# Patient Record
Sex: MALE
[De-identification: names, ages, dates, MRNs, and addresses within clinical notes are randomized per-mention and may not be internally consistent; named-entity substitution may affect disease eponyms.]

---

## 2017-02-16 ENCOUNTER — LAB SERVICES (OUTPATIENT)
Dept: OTHER | Age: 27
End: 2017-02-16

## 2017-02-16 ENCOUNTER — CHARTING TRANS (OUTPATIENT)
Dept: OTHER | Age: 27
End: 2017-02-16

## 2017-02-16 LAB — RAPID STREP GROUP A: POSITIVE

## 2017-06-20 ENCOUNTER — APPOINTMENT (OUTPATIENT)
Dept: OTHER | Facility: HOSPITAL | Age: 27
End: 2017-06-20
Attending: EMERGENCY MEDICINE

## 2018-01-02 ENCOUNTER — HOSPITAL ENCOUNTER (EMERGENCY)
Facility: HOSPITAL | Age: 28
Discharge: HOME | End: 2018-01-02
Attending: EMERGENCY MEDICINE
Payer: OTHER MISCELLANEOUS

## 2018-01-02 ENCOUNTER — APPOINTMENT (OUTPATIENT)
Dept: GENERAL RADIOLOGY | Facility: HOSPITAL | Age: 28
End: 2018-01-02
Attending: EMERGENCY MEDICINE
Payer: OTHER MISCELLANEOUS

## 2018-01-02 VITALS
HEIGHT: 70 IN | OXYGEN SATURATION: 100 % | WEIGHT: 215 LBS | SYSTOLIC BLOOD PRESSURE: 149 MMHG | DIASTOLIC BLOOD PRESSURE: 80 MMHG | BODY MASS INDEX: 30.78 KG/M2 | TEMPERATURE: 99 F | HEART RATE: 70 BPM | RESPIRATION RATE: 18 BRPM

## 2018-01-02 DIAGNOSIS — S86.912A KNEE STRAIN, LEFT, INITIAL ENCOUNTER: Primary | ICD-10-CM

## 2018-01-02 PROCEDURE — 73560 X-RAY EXAM OF KNEE 1 OR 2: CPT | Performed by: EMERGENCY MEDICINE

## 2018-01-02 PROCEDURE — 99283 EMERGENCY DEPT VISIT LOW MDM: CPT

## 2018-01-02 NOTE — ED PROVIDER NOTES
Patient Seen in: [facility] AND [facility] Emergency Department    History   Patient presents with:  Lower Extremity Injury (musculoskeletal)    Stated Complaint:     HPI    Patient presents after injury while at work.   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 No ligament instability noted no open wound distal pulse 2+ and intact motor and sensation intact  Skin:  Warm, dry, well perfused. Good skin turgor.   No rashes seen

## 2018-01-02 NOTE — ED NOTES
Care assumed from triage. Patient presents with L knee pain s/p fall at work. 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 Denies head injury or loss of consciousness.  CMS intact, lower

## 2018-01-03 NOTE — CM/SW NOTE
Received call from pt requesting assistance with obtaining [facility] appt, instructed to have claim's  name/ph# w/claim number. This writer lvmm to [address] [place] [address] of [facility] for next available appointment.

## 2018-01-05 ENCOUNTER — APPOINTMENT (OUTPATIENT)
Dept: OTHER | Facility: HOSPITAL | Age: 28
End: 2018-01-05
Attending: EMERGENCY MEDICINE

## 2018-01-12 ENCOUNTER — APPOINTMENT (OUTPATIENT)
Dept: OTHER | Facility: HOSPITAL | Age: 28
End: 2018-01-12
Attending: EMERGENCY MEDICINE

## 2018-02-21 ENCOUNTER — LAB SERVICES (OUTPATIENT)
Dept: OTHER | Age: 28
End: 2018-02-21

## 2018-02-21 ENCOUNTER — CHARTING TRANS (OUTPATIENT)
Dept: OTHER | Age: 28
End: 2018-02-21

## 2018-02-21 LAB — RAPID STREP GROUP A: POSITIVE

## 2018-11-01 VITALS
RESPIRATION RATE: 16 BRPM | OXYGEN SATURATION: 99 % | DIASTOLIC BLOOD PRESSURE: 66 MMHG | SYSTOLIC BLOOD PRESSURE: 120 MMHG | HEART RATE: 74 BPM | WEIGHT: 210 LBS | BODY MASS INDEX: 29.4 KG/M2 | HEIGHT: 71 IN

## 2018-11-05 VITALS
DIASTOLIC BLOOD PRESSURE: 60 MMHG | TEMPERATURE: 98.2 F | BODY MASS INDEX: 28.7 KG/M2 | WEIGHT: 205 LBS | HEART RATE: 56 BPM | HEIGHT: 71 IN | SYSTOLIC BLOOD PRESSURE: 100 MMHG

## 2019-02-21 ENCOUNTER — WALK IN (OUTPATIENT)
Dept: URGENT CARE | Age: 29
End: 2019-02-21

## 2019-02-21 VITALS
DIASTOLIC BLOOD PRESSURE: 70 MMHG | BODY MASS INDEX: 28.89 KG/M2 | RESPIRATION RATE: 14 BRPM | TEMPERATURE: 99.5 F | HEART RATE: 62 BPM | HEIGHT: 72 IN | WEIGHT: 213.29 LBS | SYSTOLIC BLOOD PRESSURE: 120 MMHG | OXYGEN SATURATION: 98 %

## 2019-02-21 DIAGNOSIS — J00 ACUTE NASOPHARYNGITIS: Primary | ICD-10-CM

## 2019-02-21 PROCEDURE — 87081 CULTURE SCREEN ONLY: CPT | Performed by: NURSE PRACTITIONER

## 2019-02-21 PROCEDURE — 87880 STREP A ASSAY W/OPTIC: CPT | Performed by: NURSE PRACTITIONER

## 2019-02-21 PROCEDURE — 99213 OFFICE O/P EST LOW 20 MIN: CPT | Performed by: NURSE PRACTITIONER

## 2019-02-21 ASSESSMENT — ENCOUNTER SYMPTOMS
GASTROINTESTINAL NEGATIVE: 1
SHORTNESS OF BREATH: 0
WHEEZING: 0
NEUROLOGICAL NEGATIVE: 1

## 2019-02-24 LAB
REPORT STATUS (RPT): NORMAL
S PYO SPEC QL CULT: NORMAL
SPECIMEN SOURCE: NORMAL

## 2020-07-25 ENCOUNTER — HOSPITAL ENCOUNTER (EMERGENCY)
Facility: HOSPITAL | Age: 30
Discharge: HOME | End: 2020-07-25
Attending: EMERGENCY MEDICINE
Payer: COMMERCIAL

## 2020-07-25 VITALS
SYSTOLIC BLOOD PRESSURE: 144 MMHG | DIASTOLIC BLOOD PRESSURE: 91 MMHG | WEIGHT: 200 LBS | BODY MASS INDEX: 28.63 KG/M2 | OXYGEN SATURATION: 99 % | HEART RATE: 65 BPM | HEIGHT: 70 IN | TEMPERATURE: 99 F | RESPIRATION RATE: 18 BRPM

## 2020-07-25 DIAGNOSIS — Z20.822 CLOSE EXPOSURE TO COVID-19 VIRUS: Primary | ICD-10-CM

## 2020-07-25 LAB — SARS-COV-2 RNA RESP QL NAA+PROBE: NOT DETECTED

## 2020-07-25 PROCEDURE — 99283 EMERGENCY DEPT VISIT LOW MDM: CPT

## 2020-07-25 NOTE — ED PROVIDER NOTES
Patient Seen in: [facility] AND [facility] Emergency Department      History   Patient presents with:  Testing    Stated Complaint: testing    HPI    26-year-old male without significant past medical history presents requesting COVID-19 testing.   The patient aditya to COVID-19 virus  (primary encounter diagnosis)    Disposition:  Discharge  7/25/2020  7:54 am    Follow-up:  Len Fraga MD  [address] [address]  [phone]                Medications Prescribed:  There are no discharge medicatio

## 2020-10-09 ENCOUNTER — APPOINTMENT (OUTPATIENT)
Dept: OTHER | Facility: HOSPITAL | Age: 30
End: 2020-10-09
Attending: EMERGENCY MEDICINE

## 2020-10-15 ENCOUNTER — APPOINTMENT (OUTPATIENT)
Dept: OTHER | Facility: HOSPITAL | Age: 30
End: 2020-10-15
Attending: EMERGENCY MEDICINE

## 2020-11-06 ENCOUNTER — OFFICE VISIT (OUTPATIENT)
Dept: OTHER | Facility: HOSPITAL | Age: 30
End: 2020-11-06
Attending: EMERGENCY MEDICINE

## 2020-11-06 ENCOUNTER — HOSPITAL ENCOUNTER (OUTPATIENT)
Dept: GENERAL RADIOLOGY | Facility: HOSPITAL | Age: 30
Discharge: HOME | End: 2020-11-06
Attending: EMERGENCY MEDICINE

## 2020-11-06 DIAGNOSIS — R52 PAIN: ICD-10-CM

## 2020-11-06 DIAGNOSIS — R52 PAIN: Primary | ICD-10-CM

## 2020-11-06 PROCEDURE — 73610 X-RAY EXAM OF ANKLE: CPT | Performed by: EMERGENCY MEDICINE

## 2020-11-06 PROCEDURE — 73650 X-RAY EXAM OF HEEL: CPT | Performed by: EMERGENCY MEDICINE

## 2020-11-06 PROCEDURE — 73630 X-RAY EXAM OF FOOT: CPT | Performed by: EMERGENCY MEDICINE

## 2020-11-09 ENCOUNTER — APPOINTMENT (OUTPATIENT)
Dept: OTHER | Facility: HOSPITAL | Age: 30
End: 2020-11-09
Attending: EMERGENCY MEDICINE

## 2020-11-24 ENCOUNTER — APPOINTMENT (OUTPATIENT)
Dept: OTHER | Facility: HOSPITAL | Age: 30
End: 2020-11-24
Attending: EMERGENCY MEDICINE

## 2020-12-22 ENCOUNTER — IMMUNIZATION (OUTPATIENT)
Dept: LAB | Facility: HOSPITAL | Age: 30
End: 2020-12-22
Attending: PREVENTIVE MEDICINE
Payer: COMMERCIAL

## 2020-12-22 DIAGNOSIS — Z23 NEED FOR VACCINATION: ICD-10-CM

## 2020-12-22 PROCEDURE — 0001A SARSCOV2 VAC 30MCG/0.3ML IM: CPT

## 2021-01-12 ENCOUNTER — IMMUNIZATION (OUTPATIENT)
Dept: LAB | Facility: HOSPITAL | Age: 31
End: 2021-01-12
Attending: PREVENTIVE MEDICINE
Payer: COMMERCIAL

## 2021-01-12 DIAGNOSIS — Z23 NEED FOR VACCINATION: Primary | ICD-10-CM

## 2021-01-12 PROCEDURE — 0002A SARSCOV2 VAC 30MCG/0.3ML IM: CPT

## (undated) NOTE — ED AVS SNAPSHOT
Mr. Vicente Peters   MRN: [id number]    Department:  [facility] Emergency Department   Date of Visit:  1/2/2018           Disclosure     Insurance plans vary and the physician(s) referred by the ER may not be covered by your plan.  Please conta CARE PHYSICIAN AT ONCE OR RETURN IMMEDIATELY TO THE EMERGENCY DEPARTMENT. If you have been prescribed any medication(s), please fill your prescription right away and begin taking the medication(s) as directed.   If you believe that any of the medications

## (undated) NOTE — LETTER
January 2, 2018    Patient: Felix Soriano   Date of Visit: 1/2/2018       To Whom It May Concern:    Laure Kirkyuki was seen and treated in our emergency department on 1/2/2018.  He can return to work with these limitations: No strenuous physical activ